# Patient Record
Sex: FEMALE | Race: WHITE | Employment: FULL TIME | URBAN - METROPOLITAN AREA
[De-identification: names, ages, dates, MRNs, and addresses within clinical notes are randomized per-mention and may not be internally consistent; named-entity substitution may affect disease eponyms.]

---

## 2021-11-24 ENCOUNTER — HOSPITAL ENCOUNTER (EMERGENCY)
Age: 51
Discharge: HOME | End: 2021-11-24
Attending: EMERGENCY MEDICINE
Payer: COMMERCIAL

## 2021-11-24 ENCOUNTER — APPOINTMENT (OUTPATIENT)
Dept: GENERAL RADIOLOGY | Age: 51
End: 2021-11-24
Attending: PHYSICIAN ASSISTANT
Payer: COMMERCIAL

## 2021-11-24 VITALS
SYSTOLIC BLOOD PRESSURE: 146 MMHG | TEMPERATURE: 98.6 F | OXYGEN SATURATION: 100 % | DIASTOLIC BLOOD PRESSURE: 63 MMHG | HEART RATE: 63 BPM | RESPIRATION RATE: 15 BRPM

## 2021-11-24 DIAGNOSIS — S16.1XXA STRAIN OF NECK MUSCLE, INITIAL ENCOUNTER: ICD-10-CM
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
## 2021-11-24 DIAGNOSIS — S46.812A STRAIN OF LEFT TRAPEZIUS MUSCLE, INITIAL ENCOUNTER: ICD-10-CM

## 2021-11-24 PROCEDURE — 99282 EMERGENCY DEPT VISIT SF MDM: CPT

## 2021-11-24 PROCEDURE — 72050 X-RAY EXAM NECK SPINE 4/5VWS: CPT

## 2021-11-24 RX ORDER — METHOCARBAMOL 500 MG/1
500 TABLET, FILM COATED ORAL
Qty: 20 TABLET | Refills: 0 | Status: SHIPPED | OUTPATIENT
Start: 2021-11-24

## 2021-11-24 NOTE — DISCHARGE INSTRUCTIONS
Rest, expect to be sore  Ice to bruises  Heat to stiff areas  Gentle stretching and massage  Recommend over-the-counter ibuprofen or Aleve for pain  Medication as prescribed for muscle pain and stiffness  Follow-up with your PCP
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
## 2023-04-07 ENCOUNTER — OFFICE VISIT (OUTPATIENT)
Age: 53
End: 2023-04-07
Payer: COMMERCIAL

## 2023-04-07 VITALS
BODY MASS INDEX: 33.05 KG/M2 | SYSTOLIC BLOOD PRESSURE: 116 MMHG | RESPIRATION RATE: 14 BRPM | TEMPERATURE: 97.5 F | WEIGHT: 193.6 LBS | DIASTOLIC BLOOD PRESSURE: 78 MMHG | HEIGHT: 64 IN | OXYGEN SATURATION: 98 % | HEART RATE: 78 BPM

## 2023-04-07 DIAGNOSIS — I83.813 VARICOSE VEINS OF BOTH LOWER EXTREMITIES WITH PAIN: Primary | ICD-10-CM

## 2023-04-07 PROCEDURE — 99203 OFFICE O/P NEW LOW 30 MIN: CPT | Performed by: SURGERY

## 2023-04-07 RX ORDER — EPINEPHRINE 0.3 MG/.3ML
INJECTION SUBCUTANEOUS
COMMUNITY

## 2023-04-07 RX ORDER — HYDROCHLOROTHIAZIDE 12.5 MG/1
12.5 TABLET ORAL DAILY
COMMUNITY
Start: 2021-10-14

## 2023-04-07 RX ORDER — THYROID,PORK 90 MG
90 TABLET ORAL DAILY
COMMUNITY
Start: 2023-02-17

## 2023-04-07 RX ORDER — OLMESARTAN MEDOXOMIL 40 MG/1
40 TABLET ORAL DAILY
COMMUNITY
Start: 2023-01-17

## 2023-04-07 NOTE — PROGRESS NOTES
[place] presents today for   Chief Complaint   Patient presents with    New Patient       Vitals:    04/07/23 0952   BP: 116/78   Site: Left Upper Arm   Position: Sitting   Pulse: 78   Resp: 14   Temp: 97.5 °F (36.4 °C)   TempSrc: Temporal   SpO2: 98%   Weight: 193 lb 9.6 oz (87.8 kg)   Height: 5' 4.2\" (1.631 m)        Is someone accompanying this pt? no    Is the patient using any DME equipment during OV? no        Johanny Ludwig is updated on all HM    1. \"Have you been to the ER, urgent care clinic since your last visit? Hospitalized since your last visit? \" No    2. \"Have you seen or consulted any other health care providers outside of the [address] [place] [address] since your last visit? \" No
insufficiency study. Patient waiting to get new school schedule before she can make appointment. Will call next week with that info. - recommended continued compression and elevation    2. Spider/Reticular veins. Patient interested in treating these. Will defer until further work up for insufficiency has been completed. We reviewed the plan with the patient and the patient understands. Devendra Atkins MD    PLEASE NOTE:  This document has been produced using voice recognition software. Unrecognized errors in transcription may be present.

## 2023-07-13 ENCOUNTER — CLINICAL DOCUMENTATION (OUTPATIENT)
Age: 53
End: 2023-07-13

## 2023-07-13 NOTE — PROGRESS NOTES
Called patient to reschedule her reflux study scheduled on 07/14/2023. Have to reschedule the study due to tech out sick. 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 Patient was disappointed because she scheduled this back in April. Gave other options for days and patient was not available due to work. Informed patient if we have opening on a Friday, which is her day off before school starts, we will call her. Explained to the patient that once we get the reflux study and talks to Dr. Lea Ibarra of the plan, it will take at least 14 days before we can get authorization with [place], as that is the requirement. Patient had the impression that once she talks to the provider for the result, that she can have the procedure done the week after. Explained that 14 days is needed to get the authorization. Patient said, \" I will just hang up and maybe go somewhere else\". Apologized for the inconvenience and if we do have an opening on a Friday will reach out to her. All upcoming appointments are cancelled.